# Patient Record
Sex: MALE | Race: WHITE | NOT HISPANIC OR LATINO | Employment: OTHER | URBAN - METROPOLITAN AREA
[De-identification: names, ages, dates, MRNs, and addresses within clinical notes are randomized per-mention and may not be internally consistent; named-entity substitution may affect disease eponyms.]

---

## 2021-07-04 ENCOUNTER — APPOINTMENT (OUTPATIENT)
Dept: RADIOLOGY | Facility: MEDICAL CENTER | Age: 71
End: 2021-07-04
Attending: EMERGENCY MEDICINE
Payer: MEDICARE

## 2021-07-04 ENCOUNTER — HOSPITAL ENCOUNTER (EMERGENCY)
Facility: MEDICAL CENTER | Age: 71
End: 2021-07-04
Attending: EMERGENCY MEDICINE
Payer: MEDICARE

## 2021-07-04 VITALS
OXYGEN SATURATION: 97 % | DIASTOLIC BLOOD PRESSURE: 68 MMHG | WEIGHT: 191 LBS | HEART RATE: 67 BPM | SYSTOLIC BLOOD PRESSURE: 120 MMHG | TEMPERATURE: 96.8 F | RESPIRATION RATE: 18 BRPM

## 2021-07-04 DIAGNOSIS — S09.90XA CLOSED HEAD INJURY, INITIAL ENCOUNTER: ICD-10-CM

## 2021-07-04 DIAGNOSIS — S01.21XA LACERATION OF NOSE, INITIAL ENCOUNTER: ICD-10-CM

## 2021-07-04 PROCEDURE — 304217 HCHG IRRIGATION SYSTEM

## 2021-07-04 PROCEDURE — 90715 TDAP VACCINE 7 YRS/> IM: CPT | Performed by: EMERGENCY MEDICINE

## 2021-07-04 PROCEDURE — 700111 HCHG RX REV CODE 636 W/ 250 OVERRIDE (IP): Performed by: EMERGENCY MEDICINE

## 2021-07-04 PROCEDURE — 70450 CT HEAD/BRAIN W/O DYE: CPT | Mod: MH

## 2021-07-04 PROCEDURE — 99284 EMERGENCY DEPT VISIT MOD MDM: CPT

## 2021-07-04 PROCEDURE — 90471 IMMUNIZATION ADMIN: CPT

## 2021-07-04 PROCEDURE — 304999 HCHG REPAIR-SIMPLE/INTERMED LEVEL 1

## 2021-07-04 PROCEDURE — 700101 HCHG RX REV CODE 250: Performed by: EMERGENCY MEDICINE

## 2021-07-04 PROCEDURE — 303747 HCHG EXTRA SUTURE

## 2021-07-04 RX ORDER — LIDOCAINE HYDROCHLORIDE AND EPINEPHRINE 10; 10 MG/ML; UG/ML
20 INJECTION, SOLUTION INFILTRATION; PERINEURAL ONCE
Status: COMPLETED | OUTPATIENT
Start: 2021-07-04 | End: 2021-07-04

## 2021-07-04 RX ADMIN — LIDOCAINE HYDROCHLORIDE AND EPINEPHRINE 20 ML: 10; 10 INJECTION, SOLUTION INFILTRATION; PERINEURAL at 20:55

## 2021-07-04 RX ADMIN — CLOSTRIDIUM TETANI TOXOID ANTIGEN (FORMALDEHYDE INACTIVATED), CORYNEBACTERIUM DIPHTHERIAE TOXOID ANTIGEN (FORMALDEHYDE INACTIVATED), BORDETELLA PERTUSSIS TOXOID ANTIGEN (GLUTARALDEHYDE INACTIVATED), BORDETELLA PERTUSSIS FILAMENTOUS HEMAGGLUTININ ANTIGEN (FORMALDEHYDE INACTIVATED), BORDETELLA PERTUSSIS PERTACTIN ANTIGEN, AND BORDETELLA PERTUSSIS FIMBRIAE 2/3 ANTIGEN 0.5 ML: 5; 2; 2.5; 5; 3; 5 INJECTION, SUSPENSION INTRAMUSCULAR at 20:55

## 2021-07-05 NOTE — ED PROVIDER NOTES
ED Provider Note    Scribed for Dr. Pasquale Murphy M.D. by Elvin Hassan. 7/4/2021  6:03 PM    Primary care provider: No primary care provider noted.  Means of arrival: EMS  History obtained from: Patient/EMS  History limited by: None    CHIEF COMPLAINT  Chief Complaint   Patient presents with    Closed Head Injury     pt had a few alcoholic cocktails and tripped and fell.  abrasions to nose, forehead, L cheek, and knees       HPI  Mo Ch is a 71 y.o. male who presents to the Emergency Department via EMS as a TBI Activation status post GLF. Per EMS, patient was walking out of a bar when he tripped, hitting his face on the ground. He was wearing glasses when he hit his face, and has a laceration to his nose and forehead. Denies any loss of consciousness, neck pain, or vomiting. He is not currently on anticoagulants.     REVIEW OF SYSTEMS  Pertinent positives include GLF, facial lacerations. Pertinent negatives include no loss of consciousness, neck pain, or vomiting. As above, all other systems reviewed and are negative.   See HPI for further details.     PAST MEDICAL HISTORY   has a past medical history of Hypertension and Seizure disorder (HCC).    SURGICAL HISTORY  patient denies any surgical history    SOCIAL HISTORY  Social History     Tobacco Use    Smoking status: Never Smoker    Smokeless tobacco: Never Used   Vaping Use    Vaping Use: Never used   Substance Use Topics    Alcohol use: Yes    Drug use: Never      Social History     Substance and Sexual Activity   Drug Use Never       FAMILY HISTORY  History reviewed. No pertinent family history.    CURRENT MEDICATIONS  No current outpatient medications    ALLERGIES  Allergies   Allergen Reactions    Iodine        PHYSICAL EXAM  VITAL SIGNS: /78   Pulse 61   Temp 36 °C (96.8 °F) (Temporal)   Resp 18   Wt 86.6 kg (191 lb)   SpO2 97%     Constitutional: Slurred speech, no acute distress.   HENT: Large abrasion center of the forehead with 1 cm  laceration of the bridge of nose. Normocephalic, Atraumatic, Bilateral external ears normal, Oropharynx moist, No oral exudates.   Eyes: PERRLA, EOMI, Conjunctiva normal, No discharge.   Neck: No tenderness, Supple, No stridor.   Lymphatic: No lymphadenopathy noted.   Cardiovascular: Normal heart rate, Normal rhythm.   Thorax & Lungs: Clear to auscultation bilaterally, No respiratory distress, No wheezing, No crackles.   Abdomen: Soft, No tenderness, No masses, No pulsatile masses.   Skin: Warm, Dry, No erythema, No rash.   Extremities:, No edema No cyanosis.   Musculoskeletal: No tenderness to palpation or major deformities noted.  Intact distal pulses  Neurologic: Awake, alert. Moves all extremities spontaneously.  Psychiatric: Affect normal, Judgment normal, Mood normal.     RADIOLOGY  CT-HEAD W/O   Final Result      1. No CT evidence of acute infarct, hemorrhage or mass.   2. Mild global parenchymal atrophy. Chronic small vessel ischemic changes.        The radiologist's interpretation of all radiological studies have been reviewed by me.    Laceration Repair Procedure Note    Indication: Laceration    Procedure: The patient was placed in the appropriate position and anesthesia around the laceration was obtained by infiltration using 1% Lidocaine with epinephrine. The area was then cleansed with betadine and draped in a sterile fashion and irrigated with normal saline. The laceration was closed with 5-0 Ethilon using interrupted sutures. There were no additional lacerations requiring repair. The wound area was then dressed with a bandage.      Total repaired wound length: 1.5 cm.     Other Items: Suture count: 3    The patient tolerated the procedure well.    Complications: None      COURSE & MEDICAL DECISION MAKING  Pertinent Labs & Imaging studies reviewed. (See chart for details)    6:03 PM - Patient seen and examined at Charge Desk as a TBI Activation. Ordered CT-head w/o to evaluate his symptoms. The  differential diagnoses include but are not limited to: Closed head injury, facial laceration, fracture intracranial hemorrhage    8:20 PM - Patient's laceration was repaired at this time as outlined above. Updated patient on imaging results. Return precautions were discussed with the patient, and they were cleared for discharge at this time. Patient was understanding and agreeable to discharge.    Decision Making:  Patient presented as a fall head injury, seems somewhat intoxicated CT scan done to rule out acute i head injury CT is negative.  Nasal laceration repaired as above    The patient will return for new or worsening symptoms and is stable at the time of discharge.    The patient is referred to a primary physician for blood pressure management, diabetic screening, and for all other preventative health concerns.    DISPOSITION:  Patient will be discharged home in stable condition.    FOLLOW UP:  No follow-up provider specified.    OUTPATIENT MEDICATIONS:  New Prescriptions    No medications on file       FINAL IMPRESSION  1. Closed head injury, initial encounter    2. Laceration of nose, initial encounter          Elvin DOWLING (Kris), am scribing for, and in the presence of, Pasquale Murphy M.D..    Electronically signed by: Elvin Hassan (Kris), 7/4/2021    Pasquale DOWLING M.D. personally performed the services described in this documentation, as scribed by Elvin Hassan in my presence, and it is both accurate and complete. C.    The note accurately reflects work and decisions made by me.  Pasquale Murphy M.D.  7/4/2021  11:55 PM

## 2021-07-05 NOTE — ED NOTES
Pt preformed road test and ambulated with a steady gait. Wound was cleaned and dressed. Wife at bedside for all DC instructions. All questions were answered. Pt ambulatory at DC.

## 2021-07-05 NOTE — ED NOTES
Pt is still waiting to see ERP. Will clean up pts wounds. VS are stable pt is resting with family at bedside. Pt shows no s/s of distress. Will continue to monitor.

## 2021-07-05 NOTE — ED NOTES
Pts wound was cleaned with sterial water, soap and betadine. Pt is waiting to be sutured up by ERP.

## 2021-07-05 NOTE — ED TRIAGE NOTES
Chief Complaint   Patient presents with   • Closed Head Injury     pt had a few alcoholic cocktails and tripped and fell.  abrasions to nose, forehead, L cheek, and knees     Pt to CT scan upon arrival.